# Patient Record
Sex: FEMALE | Race: ASIAN | NOT HISPANIC OR LATINO | URBAN - METROPOLITAN AREA
[De-identification: names, ages, dates, MRNs, and addresses within clinical notes are randomized per-mention and may not be internally consistent; named-entity substitution may affect disease eponyms.]

---

## 2023-07-01 ENCOUNTER — OUTPATIENT (OUTPATIENT)
Dept: OUTPATIENT SERVICES | Facility: HOSPITAL | Age: 28
LOS: 1 days | End: 2023-07-01
Payer: COMMERCIAL

## 2023-07-01 DIAGNOSIS — Z3A.00 WEEKS OF GESTATION OF PREGNANCY NOT SPECIFIED: ICD-10-CM

## 2023-07-01 DIAGNOSIS — O26.899 OTHER SPECIFIED PREGNANCY RELATED CONDITIONS, UNSPECIFIED TRIMESTER: ICD-10-CM

## 2023-07-01 PROCEDURE — G0463: CPT

## 2023-07-01 PROCEDURE — 76815 OB US LIMITED FETUS(S): CPT | Mod: 26

## 2023-07-01 PROCEDURE — 76815 OB US LIMITED FETUS(S): CPT

## 2023-07-01 PROCEDURE — 76819 FETAL BIOPHYS PROFIL W/O NST: CPT | Mod: 26

## 2023-07-01 PROCEDURE — 76819 FETAL BIOPHYS PROFIL W/O NST: CPT

## 2023-07-01 PROCEDURE — 59025 FETAL NON-STRESS TEST: CPT

## 2023-07-03 ENCOUNTER — INPATIENT (INPATIENT)
Facility: HOSPITAL | Age: 28
LOS: 2 days | Discharge: HOME | End: 2023-07-06
Attending: OBSTETRICS & GYNECOLOGY | Admitting: OBSTETRICS & GYNECOLOGY
Payer: COMMERCIAL

## 2023-07-03 DIAGNOSIS — Z34.80 ENCOUNTER FOR SUPERVISION OF OTHER NORMAL PREGNANCY, UNSPECIFIED TRIMESTER: ICD-10-CM

## 2023-07-03 DIAGNOSIS — O26.899 OTHER SPECIFIED PREGNANCY RELATED CONDITIONS, UNSPECIFIED TRIMESTER: ICD-10-CM

## 2023-07-03 DIAGNOSIS — Z3A.00 WEEKS OF GESTATION OF PREGNANCY NOT SPECIFIED: ICD-10-CM

## 2023-07-03 LAB
APTT BLD: 30.6 SEC — SIGNIFICANT CHANGE UP (ref 27.5–35.5)
BASOPHILS # BLD AUTO: 0 K/UL — SIGNIFICANT CHANGE UP (ref 0–0.2)
BASOPHILS NFR BLD AUTO: 0 % — SIGNIFICANT CHANGE UP (ref 0–2)
EOSINOPHIL # BLD AUTO: 0 K/UL — SIGNIFICANT CHANGE UP (ref 0–0.5)
EOSINOPHIL NFR BLD AUTO: 0 % — SIGNIFICANT CHANGE UP (ref 0–6)
HCT VFR BLD CALC: 33.9 % — LOW (ref 34.5–45)
HGB BLD-MCNC: 11.1 G/DL — LOW (ref 11.5–15.5)
INR BLD: 0.95 RATIO — SIGNIFICANT CHANGE UP (ref 0.88–1.16)
LYMPHOCYTES # BLD AUTO: 12 % — LOW (ref 13–44)
LYMPHOCYTES # BLD AUTO: 2.52 K/UL — SIGNIFICANT CHANGE UP (ref 1–3.3)
MCHC RBC-ENTMCNC: 29.9 PG — SIGNIFICANT CHANGE UP (ref 27–34)
MCHC RBC-ENTMCNC: 32.7 GM/DL — SIGNIFICANT CHANGE UP (ref 32–36)
MCV RBC AUTO: 91.4 FL — SIGNIFICANT CHANGE UP (ref 80–100)
MONOCYTES # BLD AUTO: 1.26 K/UL — HIGH (ref 0–0.9)
MONOCYTES NFR BLD AUTO: 6 % — SIGNIFICANT CHANGE UP (ref 2–14)
NEUTROPHILS # BLD AUTO: 16.78 K/UL — HIGH (ref 1.8–7.4)
NEUTROPHILS NFR BLD AUTO: 79 % — HIGH (ref 43–77)
PLATELET # BLD AUTO: 375 K/UL — SIGNIFICANT CHANGE UP (ref 150–400)
PROTHROM AB SERPL-ACNC: 11.3 SEC — SIGNIFICANT CHANGE UP (ref 10.5–13.4)
RBC # BLD: 3.71 M/UL — LOW (ref 3.8–5.2)
RBC # FLD: 13.5 % — SIGNIFICANT CHANGE UP (ref 10.3–14.5)
T PALLIDUM AB TITR SER: NEGATIVE — SIGNIFICANT CHANGE UP
WBC # BLD: 20.97 K/UL — HIGH (ref 3.8–10.5)
WBC # FLD AUTO: 20.97 K/UL — HIGH (ref 3.8–10.5)

## 2023-07-03 RX ORDER — AMPICILLIN TRIHYDRATE 250 MG
2 CAPSULE ORAL ONCE
Refills: 0 | Status: COMPLETED | OUTPATIENT
Start: 2023-07-03 | End: 2023-07-03

## 2023-07-03 RX ORDER — AMPICILLIN TRIHYDRATE 250 MG
1 CAPSULE ORAL EVERY 4 HOURS
Refills: 0 | Status: COMPLETED | OUTPATIENT
Start: 2023-07-03 | End: 2024-05-31

## 2023-07-03 RX ORDER — CHLORHEXIDINE GLUCONATE 213 G/1000ML
1 SOLUTION TOPICAL DAILY
Refills: 0 | Status: DISCONTINUED | OUTPATIENT
Start: 2023-07-03 | End: 2023-07-04

## 2023-07-03 RX ORDER — AMPICILLIN TRIHYDRATE 250 MG
2 CAPSULE ORAL ONCE
Refills: 0 | Status: COMPLETED | OUTPATIENT
Start: 2023-07-03 | End: 2024-05-31

## 2023-07-03 RX ORDER — AMPICILLIN TRIHYDRATE 250 MG
1 CAPSULE ORAL EVERY 4 HOURS
Refills: 0 | Status: DISCONTINUED | OUTPATIENT
Start: 2023-07-03 | End: 2023-07-04

## 2023-07-03 RX ORDER — SODIUM CHLORIDE 9 MG/ML
1000 INJECTION, SOLUTION INTRAVENOUS
Refills: 0 | Status: DISCONTINUED | OUTPATIENT
Start: 2023-07-03 | End: 2023-07-04

## 2023-07-03 RX ADMIN — Medication 108 GRAM(S): at 23:35

## 2023-07-03 RX ADMIN — Medication 108 GRAM(S): at 19:35

## 2023-07-03 RX ADMIN — Medication 200 GRAM(S): at 14:15

## 2023-07-03 RX ADMIN — SODIUM CHLORIDE 125 MILLILITER(S): 9 INJECTION, SOLUTION INTRAVENOUS at 19:35

## 2023-07-03 RX ADMIN — SODIUM CHLORIDE 125 MILLILITER(S): 9 INJECTION, SOLUTION INTRAVENOUS at 11:40

## 2023-07-03 NOTE — PATIENT PROFILE OB - FUNCTIONAL ASSESSMENT - DAILY ACTIVITY 2.
Inpatient Physical Exam Inpatient Physical Exam Inpatient Physical Exam Other Other Other Other Other Other Other ED Physical Exam ED Physical Exam ED Physical Exam Inpatient Physical Exam Inpatient Physical Exam Inpatient Physical Exam Inpatient Physical Exam Inpatient Physical Exam Inpatient Physical Exam Inpatient Physical Exam Inpatient Physical Exam Inpatient Physical Exam Inpatient Physical Exam Inpatient Physical Exam Inpatient Physical Exam Inpatient Physical Exam Inpatient Physical Exam Inpatient Physical Exam Inpatient Physical Exam Inpatient Physical Exam Inpatient Physical Exam Inpatient Physical Exam Inpatient Physical Exam Inpatient Physical Exam Inpatient Physical Exam Inpatient Physical Exam Inpatient Physical Exam Inpatient Physical Exam Inpatient Physical Exam Other 4 = No assist / stand by assistance

## 2023-07-04 VITALS
HEART RATE: 121 BPM | OXYGEN SATURATION: 100 % | TEMPERATURE: 98 F | RESPIRATION RATE: 16 BRPM | DIASTOLIC BLOOD PRESSURE: 52 MMHG | SYSTOLIC BLOOD PRESSURE: 103 MMHG

## 2023-07-04 LAB — ABO RH CONFIRMATION: SIGNIFICANT CHANGE UP

## 2023-07-04 RX ORDER — TETANUS TOXOID, REDUCED DIPHTHERIA TOXOID AND ACELLULAR PERTUSSIS VACCINE, ADSORBED 5; 2.5; 8; 8; 2.5 [IU]/.5ML; [IU]/.5ML; UG/.5ML; UG/.5ML; UG/.5ML
0.5 SUSPENSION INTRAMUSCULAR ONCE
Refills: 0 | Status: DISCONTINUED | OUTPATIENT
Start: 2023-07-04 | End: 2023-07-06

## 2023-07-04 RX ORDER — AER TRAVELER 0.5 G/1
1 SOLUTION RECTAL; TOPICAL EVERY 4 HOURS
Refills: 0 | Status: DISCONTINUED | OUTPATIENT
Start: 2023-07-04 | End: 2023-07-06

## 2023-07-04 RX ORDER — MAGNESIUM HYDROXIDE 400 MG/1
30 TABLET, CHEWABLE ORAL
Refills: 0 | Status: DISCONTINUED | OUTPATIENT
Start: 2023-07-04 | End: 2023-07-06

## 2023-07-04 RX ORDER — LANOLIN
1 OINTMENT (GRAM) TOPICAL EVERY 6 HOURS
Refills: 0 | Status: DISCONTINUED | OUTPATIENT
Start: 2023-07-04 | End: 2023-07-06

## 2023-07-04 RX ORDER — DIPHENHYDRAMINE HCL 50 MG
25 CAPSULE ORAL EVERY 6 HOURS
Refills: 0 | Status: DISCONTINUED | OUTPATIENT
Start: 2023-07-04 | End: 2023-07-06

## 2023-07-04 RX ORDER — SODIUM CHLORIDE 9 MG/ML
1000 INJECTION, SOLUTION INTRAVENOUS
Refills: 0 | Status: DISCONTINUED | OUTPATIENT
Start: 2023-07-04 | End: 2023-07-06

## 2023-07-04 RX ORDER — KETOROLAC TROMETHAMINE 30 MG/ML
30 SYRINGE (ML) INJECTION ONCE
Refills: 0 | Status: DISCONTINUED | OUTPATIENT
Start: 2023-07-04 | End: 2023-07-04

## 2023-07-04 RX ORDER — ASCORBIC ACID 60 MG
500 TABLET,CHEWABLE ORAL DAILY
Refills: 0 | Status: DISCONTINUED | OUTPATIENT
Start: 2023-07-04 | End: 2023-07-06

## 2023-07-04 RX ORDER — OXYTOCIN 10 UNIT/ML
VIAL (ML) INJECTION
Qty: 30 | Refills: 0 | Status: DISCONTINUED | OUTPATIENT
Start: 2023-07-04 | End: 2023-07-04

## 2023-07-04 RX ORDER — BENZOCAINE 10 %
1 GEL (GRAM) MUCOUS MEMBRANE EVERY 6 HOURS
Refills: 0 | Status: DISCONTINUED | OUTPATIENT
Start: 2023-07-04 | End: 2023-07-06

## 2023-07-04 RX ORDER — OXYTOCIN 10 UNIT/ML
41.67 VIAL (ML) INJECTION
Qty: 20 | Refills: 0 | Status: DISCONTINUED | OUTPATIENT
Start: 2023-07-04 | End: 2023-07-06

## 2023-07-04 RX ORDER — OXYCODONE HYDROCHLORIDE 5 MG/1
5 TABLET ORAL EVERY 4 HOURS
Refills: 0 | Status: DISCONTINUED | OUTPATIENT
Start: 2023-07-04 | End: 2023-07-06

## 2023-07-04 RX ORDER — ACETAMINOPHEN 500 MG
975 TABLET ORAL EVERY 6 HOURS
Refills: 0 | Status: DISCONTINUED | OUTPATIENT
Start: 2023-07-04 | End: 2023-07-06

## 2023-07-04 RX ORDER — IBUPROFEN 200 MG
600 TABLET ORAL EVERY 6 HOURS
Refills: 0 | Status: COMPLETED | OUTPATIENT
Start: 2023-07-04 | End: 2024-06-01

## 2023-07-04 RX ORDER — DIBUCAINE 1 %
1 OINTMENT (GRAM) RECTAL EVERY 6 HOURS
Refills: 0 | Status: DISCONTINUED | OUTPATIENT
Start: 2023-07-04 | End: 2023-07-06

## 2023-07-04 RX ORDER — SODIUM CHLORIDE 9 MG/ML
3 INJECTION INTRAMUSCULAR; INTRAVENOUS; SUBCUTANEOUS EVERY 8 HOURS
Refills: 0 | Status: DISCONTINUED | OUTPATIENT
Start: 2023-07-04 | End: 2023-07-06

## 2023-07-04 RX ORDER — FERROUS SULFATE 325(65) MG
325 TABLET ORAL DAILY
Refills: 0 | Status: DISCONTINUED | OUTPATIENT
Start: 2023-07-04 | End: 2023-07-06

## 2023-07-04 RX ORDER — FAMOTIDINE 10 MG/ML
20 INJECTION INTRAVENOUS
Refills: 0 | Status: DISCONTINUED | OUTPATIENT
Start: 2023-07-04 | End: 2023-07-06

## 2023-07-04 RX ORDER — SIMETHICONE 80 MG/1
80 TABLET, CHEWABLE ORAL EVERY 4 HOURS
Refills: 0 | Status: DISCONTINUED | OUTPATIENT
Start: 2023-07-04 | End: 2023-07-06

## 2023-07-04 RX ORDER — HYDROCORTISONE 1 %
1 OINTMENT (GRAM) TOPICAL EVERY 6 HOURS
Refills: 0 | Status: DISCONTINUED | OUTPATIENT
Start: 2023-07-04 | End: 2023-07-06

## 2023-07-04 RX ORDER — PRAMOXINE HYDROCHLORIDE 150 MG/15G
1 AEROSOL, FOAM RECTAL EVERY 4 HOURS
Refills: 0 | Status: DISCONTINUED | OUTPATIENT
Start: 2023-07-04 | End: 2023-07-06

## 2023-07-04 RX ADMIN — Medication 125 MILLIUNIT(S)/MIN: at 17:07

## 2023-07-04 RX ADMIN — Medication 975 MILLIGRAM(S): at 22:29

## 2023-07-04 RX ADMIN — Medication 2 MILLIUNIT(S)/MIN: at 13:20

## 2023-07-04 RX ADMIN — Medication 975 MILLIGRAM(S): at 23:29

## 2023-07-04 RX ADMIN — Medication 108 GRAM(S): at 15:40

## 2023-07-04 RX ADMIN — Medication 108 GRAM(S): at 07:35

## 2023-07-04 RX ADMIN — CHLORHEXIDINE GLUCONATE 1 APPLICATION(S): 213 SOLUTION TOPICAL at 12:29

## 2023-07-04 RX ADMIN — SODIUM CHLORIDE 125 MILLILITER(S): 9 INJECTION, SOLUTION INTRAVENOUS at 12:50

## 2023-07-04 RX ADMIN — Medication 108 GRAM(S): at 11:35

## 2023-07-04 RX ADMIN — SODIUM CHLORIDE 3 MILLILITER(S): 9 INJECTION INTRAMUSCULAR; INTRAVENOUS; SUBCUTANEOUS at 22:28

## 2023-07-04 RX ADMIN — Medication 108 GRAM(S): at 03:35

## 2023-07-04 RX ADMIN — Medication 30 MILLIGRAM(S): at 19:24

## 2023-07-04 RX ADMIN — Medication 30 MILLIGRAM(S): at 19:44

## 2023-07-04 RX ADMIN — Medication 1 SPRAY(S): at 22:28

## 2023-07-04 RX ADMIN — SODIUM CHLORIDE 125 MILLILITER(S): 9 INJECTION, SOLUTION INTRAVENOUS at 08:45

## 2023-07-05 LAB
BASOPHILS # BLD AUTO: 0.05 K/UL — SIGNIFICANT CHANGE UP (ref 0–0.2)
BASOPHILS # BLD AUTO: SIGNIFICANT CHANGE UP K/UL (ref 0–0.2)
BASOPHILS NFR BLD AUTO: 0.2 % — SIGNIFICANT CHANGE UP (ref 0–2)
BASOPHILS NFR BLD AUTO: SIGNIFICANT CHANGE UP % (ref 0–2)
EOSINOPHIL # BLD AUTO: 0.15 K/UL — SIGNIFICANT CHANGE UP (ref 0–0.5)
EOSINOPHIL # BLD AUTO: SIGNIFICANT CHANGE UP K/UL (ref 0–0.5)
EOSINOPHIL NFR BLD AUTO: 0.5 % — SIGNIFICANT CHANGE UP (ref 0–6)
EOSINOPHIL NFR BLD AUTO: SIGNIFICANT CHANGE UP % (ref 0–6)
HCT VFR BLD CALC: 28.8 % — LOW (ref 34.5–45)
HCT VFR BLD CALC: 30 % — LOW (ref 34.5–45)
HGB BLD-MCNC: 9.4 G/DL — LOW (ref 11.5–15.5)
HGB BLD-MCNC: 9.8 G/DL — LOW (ref 11.5–15.5)
IMM GRANULOCYTES NFR BLD AUTO: 1.3 % — HIGH (ref 0–0.9)
IMM GRANULOCYTES NFR BLD AUTO: SIGNIFICANT CHANGE UP % (ref 0–0.9)
LYMPHOCYTES # BLD AUTO: 13.1 % — SIGNIFICANT CHANGE UP (ref 13–44)
LYMPHOCYTES # BLD AUTO: 3.69 K/UL — HIGH (ref 1–3.3)
LYMPHOCYTES # BLD AUTO: SIGNIFICANT CHANGE UP % (ref 13–44)
LYMPHOCYTES # BLD AUTO: SIGNIFICANT CHANGE UP K/UL (ref 1–3.3)
MCHC RBC-ENTMCNC: 29.7 PG — SIGNIFICANT CHANGE UP (ref 27–34)
MCHC RBC-ENTMCNC: 30.2 PG — SIGNIFICANT CHANGE UP (ref 27–34)
MCHC RBC-ENTMCNC: 32.6 GM/DL — SIGNIFICANT CHANGE UP (ref 32–36)
MCHC RBC-ENTMCNC: 32.7 GM/DL — SIGNIFICANT CHANGE UP (ref 32–36)
MCV RBC AUTO: 91.1 FL — SIGNIFICANT CHANGE UP (ref 80–100)
MCV RBC AUTO: 92.3 FL — SIGNIFICANT CHANGE UP (ref 80–100)
MONOCYTES # BLD AUTO: 1.86 K/UL — HIGH (ref 0–0.9)
MONOCYTES # BLD AUTO: SIGNIFICANT CHANGE UP K/UL (ref 0–0.9)
MONOCYTES NFR BLD AUTO: 6.6 % — SIGNIFICANT CHANGE UP (ref 2–14)
MONOCYTES NFR BLD AUTO: SIGNIFICANT CHANGE UP % (ref 2–14)
NEUTROPHILS # BLD AUTO: 22.12 K/UL — HIGH (ref 1.8–7.4)
NEUTROPHILS # BLD AUTO: SIGNIFICANT CHANGE UP K/UL (ref 1.8–7.4)
NEUTROPHILS NFR BLD AUTO: 78.3 % — HIGH (ref 43–77)
NEUTROPHILS NFR BLD AUTO: SIGNIFICANT CHANGE UP % (ref 43–77)
NRBC # BLD: 0 /100 WBCS — SIGNIFICANT CHANGE UP (ref 0–0)
NRBC # BLD: 0 /100 WBCS — SIGNIFICANT CHANGE UP (ref 0–0)
PLATELET # BLD AUTO: 321 K/UL — SIGNIFICANT CHANGE UP (ref 150–400)
PLATELET # BLD AUTO: 344 K/UL — SIGNIFICANT CHANGE UP (ref 150–400)
RBC # BLD: 3.16 M/UL — LOW (ref 3.8–5.2)
RBC # BLD: 3.25 M/UL — LOW (ref 3.8–5.2)
RBC # FLD: 13.5 % — SIGNIFICANT CHANGE UP (ref 10.3–14.5)
RBC # FLD: 13.8 % — SIGNIFICANT CHANGE UP (ref 10.3–14.5)
WBC # BLD: 28.23 K/UL — HIGH (ref 3.8–10.5)
WBC # BLD: 30.98 K/UL — HIGH (ref 3.8–10.5)
WBC # FLD AUTO: 28.23 K/UL — HIGH (ref 3.8–10.5)
WBC # FLD AUTO: 30.98 K/UL — HIGH (ref 3.8–10.5)

## 2023-07-05 RX ORDER — IBUPROFEN 200 MG
600 TABLET ORAL EVERY 6 HOURS
Refills: 0 | Status: DISCONTINUED | OUTPATIENT
Start: 2023-07-05 | End: 2023-07-06

## 2023-07-05 RX ORDER — ASCORBIC ACID 60 MG
1 TABLET,CHEWABLE ORAL
Qty: 0 | Refills: 0 | DISCHARGE
Start: 2023-07-05

## 2023-07-05 RX ADMIN — Medication 1 TABLET(S): at 11:26

## 2023-07-05 RX ADMIN — SODIUM CHLORIDE 3 MILLILITER(S): 9 INJECTION INTRAMUSCULAR; INTRAVENOUS; SUBCUTANEOUS at 05:53

## 2023-07-05 RX ADMIN — Medication 600 MILLIGRAM(S): at 02:37

## 2023-07-05 RX ADMIN — Medication 600 MILLIGRAM(S): at 19:08

## 2023-07-05 RX ADMIN — Medication 500 MILLIGRAM(S): at 11:24

## 2023-07-05 RX ADMIN — FAMOTIDINE 20 MILLIGRAM(S): 10 INJECTION INTRAVENOUS at 18:06

## 2023-07-05 RX ADMIN — FAMOTIDINE 20 MILLIGRAM(S): 10 INJECTION INTRAVENOUS at 06:09

## 2023-07-05 RX ADMIN — SODIUM CHLORIDE 3 MILLILITER(S): 9 INJECTION INTRAMUSCULAR; INTRAVENOUS; SUBCUTANEOUS at 14:07

## 2023-07-05 RX ADMIN — Medication 600 MILLIGRAM(S): at 18:06

## 2023-07-05 RX ADMIN — Medication 600 MILLIGRAM(S): at 12:26

## 2023-07-05 RX ADMIN — Medication 325 MILLIGRAM(S): at 11:24

## 2023-07-05 RX ADMIN — Medication 600 MILLIGRAM(S): at 03:37

## 2023-07-05 RX ADMIN — SODIUM CHLORIDE 3 MILLILITER(S): 9 INJECTION INTRAMUSCULAR; INTRAVENOUS; SUBCUTANEOUS at 21:12

## 2023-07-05 RX ADMIN — Medication 600 MILLIGRAM(S): at 11:26

## 2023-07-05 NOTE — DISCHARGE NOTE OB - HOSPITAL COURSE
Pt admitted for  induction for post dates. Pt had vaginal delivery with post partum care and was discharged to home.

## 2023-07-05 NOTE — DISCHARGE NOTE OB - CARE PROVIDER_API CALL
Kasey Forrest  Obstetrics and Gynecology  [address] [place] [address]  Phone: [phone]  Fax: [phone]  Established Patient  Follow Up Time: 1 month

## 2023-07-05 NOTE — DISCHARGE NOTE OB - MATERIALS PROVIDED
[facility] [place] Screening Program/[place]  Immunization Record/Breastfeeding Mother’s Support Group Information/Guide to Postpartum Care/Back To Sleep Handout/Shaken Baby Prevention Handout/Breastfeeding Guide and Packet/Birth Certificate Instructions/Discharge Medication Information for Patients and Families Pocket Guide/Letter of Medical Neccessity

## 2023-07-05 NOTE — DISCHARGE NOTE OB - NSTOBACCOUSAGEY/N_GEN_A_CS
Additional Notes: Patient states she only took the doxycycline pill for one month, and never picked up the Tretinoin cream from the pharmacy. Render Risk Assessment In Note?: no Detail Level: Simple No

## 2023-07-05 NOTE — PROGRESS NOTE ADULT - ASSESSMENT
A/P:  PPD#1 s/p     -Continue pain management  -Encourage OOB and ambulation  -Check CBC  -Continue current care  -Plan for discharge tomorrow  -d/w dr sullivan   A/P:  PPD#1 s/p   leukocytosis afebrile    -Continue pain management  -Encourage OOB and ambulation  -rpt  CBC in pm  -Continue current care  -Plan for discharge tomorrow  -d/w dr sullivan

## 2023-07-05 NOTE — DISCHARGE NOTE OB - MEDICATION SUMMARY - MEDICATIONS TO TAKE
I will START or STAY ON the medications listed below when I get home from the hospital:    PNV Prenatal oral tablet  -- 1 by mouth once a day  -- Indication: For vitamins    ferrous fumarate 325 mg (106 mg elemental iron) oral tablet  -- 1 tab(s) by mouth once a day MDD: 1  -- Indication: For anemia    ascorbic acid 500 mg oral tablet  -- 1 tab(s) by mouth once a day  -- Indication: For anemia

## 2023-07-05 NOTE — DISCHARGE NOTE OB - PATIENT PORTAL LINK FT
You can access the FollowMyHealth Patient Portal offered by [facility] by registering at the following website: [email or web address]. By joining [facility]’s FollowMyHealth portal, you will also be able to view your health information using other applications (apps) compatible with our system.

## 2023-07-05 NOTE — DISCHARGE NOTE OB - NS MD DC FALL RISK RISK
For information on Fall & Injury Prevention, visit: [email or web address] OR  [email or web address] OR  https://www.cdc.gov/steadi/patient.html

## 2023-07-05 NOTE — DISCHARGE NOTE OB - CARE PLAN
1 Principal Discharge DX:	Normal spontaneous vaginal delivery  Assessment and plan of treatment:	routine vaginal delivery care, no tub baths, douching or sex for 6weeks, ambulate daily  Continue prenatal vitamins for 6 weeks or duration of breastfeeding

## 2023-07-06 VITALS
DIASTOLIC BLOOD PRESSURE: 79 MMHG | OXYGEN SATURATION: 98 % | HEART RATE: 85 BPM | RESPIRATION RATE: 17 BRPM | SYSTOLIC BLOOD PRESSURE: 112 MMHG | TEMPERATURE: 97 F

## 2023-07-06 LAB
BASOPHILS # BLD AUTO: 0.06 K/UL — SIGNIFICANT CHANGE UP (ref 0–0.2)
BASOPHILS NFR BLD AUTO: 0.3 % — SIGNIFICANT CHANGE UP (ref 0–2)
EOSINOPHIL # BLD AUTO: 0.29 K/UL — SIGNIFICANT CHANGE UP (ref 0–0.5)
EOSINOPHIL NFR BLD AUTO: 1.2 % — SIGNIFICANT CHANGE UP (ref 0–6)
HCT VFR BLD CALC: 27.8 % — LOW (ref 34.5–45)
HGB BLD-MCNC: 9.1 G/DL — LOW (ref 11.5–15.5)
IMM GRANULOCYTES NFR BLD AUTO: 1.2 % — HIGH (ref 0–0.9)
LYMPHOCYTES # BLD AUTO: 16.6 % — SIGNIFICANT CHANGE UP (ref 13–44)
LYMPHOCYTES # BLD AUTO: 3.93 K/UL — HIGH (ref 1–3.3)
MCHC RBC-ENTMCNC: 29.6 PG — SIGNIFICANT CHANGE UP (ref 27–34)
MCHC RBC-ENTMCNC: 32.7 GM/DL — SIGNIFICANT CHANGE UP (ref 32–36)
MCV RBC AUTO: 90.6 FL — SIGNIFICANT CHANGE UP (ref 80–100)
MONOCYTES # BLD AUTO: 1.73 K/UL — HIGH (ref 0–0.9)
MONOCYTES NFR BLD AUTO: 7.3 % — SIGNIFICANT CHANGE UP (ref 2–14)
NEUTROPHILS # BLD AUTO: 17.45 K/UL — HIGH (ref 1.8–7.4)
NEUTROPHILS NFR BLD AUTO: 73.4 % — SIGNIFICANT CHANGE UP (ref 43–77)
NRBC # BLD: 0 /100 WBCS — SIGNIFICANT CHANGE UP (ref 0–0)
PLATELET # BLD AUTO: 325 K/UL — SIGNIFICANT CHANGE UP (ref 150–400)
RBC # BLD: 3.07 M/UL — LOW (ref 3.8–5.2)
RBC # FLD: 13.8 % — SIGNIFICANT CHANGE UP (ref 10.3–14.5)
WBC # BLD: 23.74 K/UL — HIGH (ref 3.8–10.5)
WBC # FLD AUTO: 23.74 K/UL — HIGH (ref 3.8–10.5)

## 2023-07-06 PROCEDURE — 86780 TREPONEMA PALLIDUM: CPT

## 2023-07-06 PROCEDURE — 59050 FETAL MONITOR W/REPORT: CPT

## 2023-07-06 PROCEDURE — 36415 COLL VENOUS BLD VENIPUNCTURE: CPT

## 2023-07-06 PROCEDURE — 90707 MMR VACCINE SC: CPT

## 2023-07-06 PROCEDURE — 86901 BLOOD TYPING SEROLOGIC RH(D): CPT

## 2023-07-06 PROCEDURE — 59025 FETAL NON-STRESS TEST: CPT

## 2023-07-06 PROCEDURE — C1726: CPT

## 2023-07-06 PROCEDURE — 85730 THROMBOPLASTIN TIME PARTIAL: CPT

## 2023-07-06 PROCEDURE — 86900 BLOOD TYPING SEROLOGIC ABO: CPT

## 2023-07-06 PROCEDURE — 85610 PROTHROMBIN TIME: CPT

## 2023-07-06 PROCEDURE — 85384 FIBRINOGEN ACTIVITY: CPT

## 2023-07-06 PROCEDURE — G0463: CPT

## 2023-07-06 PROCEDURE — 86850 RBC ANTIBODY SCREEN: CPT

## 2023-07-06 PROCEDURE — 85025 COMPLETE CBC W/AUTO DIFF WBC: CPT

## 2023-07-06 RX ORDER — IBUPROFEN 200 MG
1 TABLET ORAL
Qty: 20 | Refills: 0
Start: 2023-07-06 | End: 2023-07-10

## 2023-07-06 RX ORDER — FERROUS FUMARATE 350(115)MG
1 TABLET ORAL
Qty: 30 | Refills: 0
Start: 2023-07-06 | End: 2023-08-04

## 2023-07-06 RX ADMIN — Medication 600 MILLIGRAM(S): at 00:45

## 2023-07-06 RX ADMIN — FAMOTIDINE 20 MILLIGRAM(S): 10 INJECTION INTRAVENOUS at 05:56

## 2023-07-06 RX ADMIN — Medication 600 MILLIGRAM(S): at 00:12

## 2023-07-06 RX ADMIN — Medication 0.5 MILLILITER(S): at 06:00

## 2023-07-06 RX ADMIN — Medication 600 MILLIGRAM(S): at 05:56

## 2023-07-06 RX ADMIN — Medication 600 MILLIGRAM(S): at 06:30

## 2023-07-06 RX ADMIN — SODIUM CHLORIDE 3 MILLILITER(S): 9 INJECTION INTRAMUSCULAR; INTRAVENOUS; SUBCUTANEOUS at 06:07

## 2023-07-06 NOTE — PROGRESS NOTE ADULT - ASSESSMENT
A/P:  PPD#2 s/p   leukocytosis improving afebrile    -Continue pain management  -Encourage OOB and ambulation  -Continue current care  -Plan for discharge today,  reviewed instructions with patient  -d/w dr sullivan

## 2023-07-06 NOTE — LACTATION INITIAL EVALUATION - AS DELIV COMPLICATIONS OB
Post-dates; see CPN/abnormal fetal heart rate tracing
Post-dates; see CPN/abnormal fetal heart rate tracing

## 2023-07-06 NOTE — PROGRESS NOTE ADULT - SUBJECTIVE AND OBJECTIVE BOX
Patient seen at bedside resting comfortably offers no current complaints. Ambulating and voiding without difficulty.  Passing flatus and tolerating regular diet.  both breast/bottle feeding . Denies HA, CP, SOB, N/V/D, dizziness, palpitations, worsening abdominal pain, worsening vaginal bleeding, or any other concerns.    Vital Signs Last 24 Hrs  T(C): 36.6 (05 Jul 2023 06:00), Max: 37.2 (04 Jul 2023 17:46)  T(F): 97.8 (05 Jul 2023 06:00), Max: 98.9 (04 Jul 2023 17:46)  HR: 83 (05 Jul 2023 06:00) (83 - 121)  BP: 109/71 (05 Jul 2023 06:00) (103/52 - 112/71)  BP(mean): 81 (04 Jul 2023 19:16) (72 - 83)  RR: 18 (05 Jul 2023 06:00) (16 - 19)  SpO2: 99% (05 Jul 2023 06:00) (98% - 100%)    Parameters below as of 05 Jul 2023 06:00  Patient On (Oxygen Delivery Method): room air        Physical Exam:     Gen: A&Ox 3, NAD  Chest: CTA B/L  Cardiac: S1,S2; RRR  Breast: Soft, nontender, nonengorged  Abdomen: +BS, Soft, nontender,  ND; Fundus firm below umbilicus  Gyn: mod lochia, intact/repaired  Ext: Nontender, DTRS 2+, no worsening edema                          9.4    30.98 )-----------( 321      ( 05 Jul 2023 06:43 )             28.8         
Patient seen at bedside resting comfortably offers no current complaints. Ambulating and voiding without difficulty.  Passing flatus/BM and tolerating regular diet.  both breast/bottle feeding . Denies HA, CP, SOB, N/V/D, dizziness, palpitations, worsening abdominal pain, worsening vaginal bleeding, or any other concerns.    Vital Signs Last 24 Hrs  T(C): 36.6 (05 Jul 2023 06:00), Max: 37.2 (04 Jul 2023 17:46)  T(F): 97.8 (05 Jul 2023 06:00), Max: 98.9 (04 Jul 2023 17:46)  HR: 83 (05 Jul 2023 06:00) (83 - 121)  BP: 109/71 (05 Jul 2023 06:00) (103/52 - 112/71)  BP(mean): 81 (04 Jul 2023 19:16) (72 - 83)  RR: 18 (05 Jul 2023 06:00) (16 - 19)  SpO2: 99% (05 Jul 2023 06:00) (98% - 100%)    Parameters below as of 05 Jul 2023 06:00  Patient On (Oxygen Delivery Method): room air        Physical Exam:     Gen: A&Ox 3, NAD  Chest: CTA B/L  Cardiac: S1,S2; RRR  Breast: Soft, nontender, nonengorged  Abdomen: +BS, Soft, nontender,  ND; Fundus firm below umbilicus  Gyn: mod lochia, intact/repaired  Ext: Nontender, DTRS 2+, no worsening edema                          9.4    30.98 )-----------( 321      ( 05 Jul 2023 06:43 )             28.8

## 2023-07-06 NOTE — LACTATION INITIAL EVALUATION - LACTATION INTERVENTIONS
Reinforced benefits of  exclusive breastfeeding for first 6 months and provided with breastfeeding community resource list for breastfeeding support/assistance available post-discharge and of importance of early f/u with pediatrician within 2-3 days.  Referral for breastpump sent via pt's insurance per pt's request/initiate/review hand expression/post discharge community resources provided/review techniques to increase milk supply/review techniques to manage sore nipples/engorgement/reviewed components of an effective feeding and at least 8 effective feedings per day required/reviewed importance of monitoring infant diapers, the breastfeeding log, and minimum output each day/reviewed risks of unnecessary formula supplementation/reviewed risks of artificial nipples/reviewed benefits and recommendations for rooming in/reviewed feeding on demand/by cue at least 8 times a day/reviewed indications of inadequate milk transfer that would require supplementation
Baby small for age and pt. with only fair suckle per RN report; pt. c/o difficulty latching and "no milk"; nipples noted to be flat, javi with stimulation but remain somewhat short; pt. and RN state baby ate"better" last feed, took 30ml feeding from bottle/formula; taught pt. hand expression and recommended to use her expressed colostrum for feedings; Breastfeeding on cue 8-12X/24 hours with diaper count to assess for adequate intake, safe skin to skin and rooming-in encouraged./initiate/review safe skin-to-skin/initiate/review hand expression/initiate/review techniques for position and latch/review techniques to increase milk supply/reviewed components of an effective feeding and at least 8 effective feedings per day required/reviewed importance of monitoring infant diapers, the breastfeeding log, and minimum output each day/reviewed risks of unnecessary formula supplementation/reviewed benefits and recommendations for rooming in/reviewed feeding on demand/by cue at least 8 times a day